# Patient Record
Sex: MALE | Race: WHITE | NOT HISPANIC OR LATINO | Employment: OTHER | URBAN - METROPOLITAN AREA
[De-identification: names, ages, dates, MRNs, and addresses within clinical notes are randomized per-mention and may not be internally consistent; named-entity substitution may affect disease eponyms.]

---

## 2021-01-22 ENCOUNTER — IMMUNIZATIONS (OUTPATIENT)
Dept: FAMILY MEDICINE CLINIC | Facility: HOSPITAL | Age: 81
End: 2021-01-22

## 2021-01-22 DIAGNOSIS — Z23 ENCOUNTER FOR IMMUNIZATION: Primary | ICD-10-CM

## 2021-01-22 PROCEDURE — 0001A SARS-COV-2 / COVID-19 MRNA VACCINE (PFIZER-BIONTECH) 30 MCG: CPT

## 2021-01-22 PROCEDURE — 91300 SARS-COV-2 / COVID-19 MRNA VACCINE (PFIZER-BIONTECH) 30 MCG: CPT

## 2021-02-12 ENCOUNTER — IMMUNIZATIONS (OUTPATIENT)
Dept: FAMILY MEDICINE CLINIC | Facility: HOSPITAL | Age: 81
End: 2021-02-12

## 2021-02-12 DIAGNOSIS — Z23 ENCOUNTER FOR IMMUNIZATION: Primary | ICD-10-CM

## 2021-02-12 PROCEDURE — 0002A SARS-COV-2 / COVID-19 MRNA VACCINE (PFIZER-BIONTECH) 30 MCG: CPT

## 2021-02-12 PROCEDURE — 91300 SARS-COV-2 / COVID-19 MRNA VACCINE (PFIZER-BIONTECH) 30 MCG: CPT

## 2021-05-11 ENCOUNTER — OFFICE VISIT (OUTPATIENT)
Dept: PULMONOLOGY | Facility: CLINIC | Age: 81
End: 2021-05-11
Payer: COMMERCIAL

## 2021-05-11 VITALS
WEIGHT: 269.6 LBS | DIASTOLIC BLOOD PRESSURE: 74 MMHG | HEIGHT: 74 IN | SYSTOLIC BLOOD PRESSURE: 126 MMHG | HEART RATE: 71 BPM | BODY MASS INDEX: 34.6 KG/M2 | OXYGEN SATURATION: 98 % | TEMPERATURE: 96.6 F

## 2021-05-11 DIAGNOSIS — J30.2 SEASONAL ALLERGIC RHINITIS, UNSPECIFIED TRIGGER: ICD-10-CM

## 2021-05-11 DIAGNOSIS — G47.33 OBSTRUCTIVE SLEEP APNEA: Primary | ICD-10-CM

## 2021-05-11 DIAGNOSIS — I48.0 PAROXYSMAL ATRIAL FIBRILLATION (HCC): ICD-10-CM

## 2021-05-11 DIAGNOSIS — I10 ESSENTIAL HYPERTENSION: ICD-10-CM

## 2021-05-11 DIAGNOSIS — E66.9 OBESITY (BMI 30.0-34.9): ICD-10-CM

## 2021-05-11 PROBLEM — J30.9 ALLERGIC RHINITIS: Status: ACTIVE | Noted: 2021-05-11

## 2021-05-11 PROCEDURE — 99214 OFFICE O/P EST MOD 30 MIN: CPT | Performed by: INTERNAL MEDICINE

## 2021-05-11 PROCEDURE — 1160F RVW MEDS BY RX/DR IN RCRD: CPT | Performed by: INTERNAL MEDICINE

## 2021-05-11 PROCEDURE — 1036F TOBACCO NON-USER: CPT | Performed by: INTERNAL MEDICINE

## 2021-05-11 RX ORDER — MIRTAZAPINE 7.5 MG/1
TABLET, FILM COATED ORAL
COMMUNITY
Start: 2021-02-22

## 2021-05-11 RX ORDER — ALLOPURINOL 100 MG/1
100 TABLET ORAL DAILY
COMMUNITY

## 2021-05-11 RX ORDER — FENOFIBRATE 160 MG/1
TABLET ORAL
COMMUNITY
Start: 2021-04-26

## 2021-05-11 RX ORDER — FLUTICASONE PROPIONATE 50 MCG
1 SPRAY, SUSPENSION (ML) NASAL DAILY
Qty: 18.2 ML | Refills: 0 | Status: SHIPPED | OUTPATIENT
Start: 2021-05-11 | End: 2021-06-08

## 2021-05-11 RX ORDER — VALSARTAN AND HYDROCHLOROTHIAZIDE 80; 12.5 MG/1; MG/1
1 TABLET, FILM COATED ORAL DAILY
COMMUNITY
Start: 2021-04-11

## 2021-05-11 RX ORDER — OMEGA-3/DHA/EPA/FISH OIL 300-1000MG
2 CAPSULE ORAL DAILY
COMMUNITY

## 2021-05-11 RX ORDER — BACLOFEN 10 MG/1
10 TABLET ORAL 3 TIMES DAILY PRN
COMMUNITY
Start: 2021-01-30 | End: 2022-01-30

## 2021-05-11 RX ORDER — TAMSULOSIN HYDROCHLORIDE 0.4 MG/1
0.4 CAPSULE ORAL
COMMUNITY
Start: 2021-04-11

## 2021-05-11 RX ORDER — APIXABAN 5 MG/1
5 TABLET, FILM COATED ORAL 2 TIMES DAILY
COMMUNITY
Start: 2021-04-14

## 2021-05-11 RX ORDER — CEPHALEXIN 500 MG/1
CAPSULE ORAL
COMMUNITY
Start: 2021-04-19

## 2021-05-11 RX ORDER — ZOLPIDEM TARTRATE 5 MG/1
5 TABLET ORAL DAILY PRN
COMMUNITY
Start: 2021-01-13 | End: 2021-07-12

## 2021-05-11 RX ORDER — FUROSEMIDE 20 MG/1
20 TABLET ORAL DAILY
COMMUNITY
Start: 2021-04-22

## 2021-05-11 NOTE — PROGRESS NOTES
Assessment/Plan:    Obstructive sleep apnea  Mr Alexandra brunner has obstructive sleep apnea and has been on CPAP therapy at 8 cm of water  His CPAP compliance was good and was getting clinical benefit from CPAP therapy  Of late he has got a skin cancer on his nose and is not able to use the CPAP  He has some daytime sleepiness and tiredness  His sleep is disturbed  I have advised him to restart using CPAP once the radiation therapy he is over  I had a long discussion with him and answered all his questions  Paroxysmal atrial fibrillation (HCC)  He has history of paroxysmal atrial fibrillation and is currently on treatment with metoprolol for rate control and Eliquis for systemic anticoagulation  He is status post pacemaker for sick sinus syndrome  He follows with Dr Nita Hoyos  Obesity (BMI 30 0-34  9)  He is obese and understands the need for weight reduction  He understands that weight reduction can significantly improve sleep apnea  Allergic rhinitis  He has seasonal allergic rhinitis and has a runny nose and sneezing and nasal block  I have prescribed Flonase to be used every day  Essential hypertension  He has history of hypertension and has been on treatment with valsartan hydrochlorothiazide  Currently his blood pressure is controlled  The CPAP therapy also helps his blood pressure control  Diagnoses and all orders for this visit:    Obstructive sleep apnea    Paroxysmal atrial fibrillation (HCC)    Seasonal allergic rhinitis, unspecified trigger  -     fluticasone (FLONASE) 50 mcg/act nasal spray; 1 spray into each nostril daily    Obesity (BMI 30 0-34  9)    Essential hypertension    Other orders  -     allopurinol (ZYLOPRIM) 100 mg tablet; Take 100 mg by mouth daily  -     Eliquis 5 MG; Take 5 mg by mouth 2 (two) times a day  -     baclofen 10 mg tablet;  Take 10 mg by mouth Three times daily as needed  -     cephalexin (KEFLEX) 500 mg capsule  -     fenofibrate (TRIGLIDE) 160 MG tablet  -     furosemide (LASIX) 20 mg tablet; Take 20 mg by mouth daily  -     metoprolol tartrate (LOPRESSOR) 25 mg tablet; Take 25 mg by mouth daily  -     mirtazapine (REMERON) 7 5 MG tablet; TAKE 1 TABLET (7 5 MG TOTAL) BY MOUTH EVERY EVENING   -     tamsulosin (FLOMAX) 0 4 mg; Take 0 4 mg by mouth daily at bedtime  -     valsartan-hydrochlorothiazide (DIOVAN-HCT) 80-12 5 MG per tablet; Take 1 tablet by mouth daily  -     zolpidem (AMBIEN) 5 mg tablet; Take 5 mg by mouth daily as needed  -     B Complex Vitamins (VITAMIN B COMPLEX PO); Take 1 tablet by mouth daily  -     Flaxseed, Linseed, (FLAXSEED OIL PO); Take 1,200 mg by mouth daily  -     fish oil-omega-3 fatty acids 1000 MG capsule; Take 2 g by mouth daily          Subjective:      Patient ID: Natalia Greene is a 79 y o  male  Mr Arianne Fleming has obstructive sleep apnea and has been on CPAP therapy  He was diagnosed with obstructive sleep apnea of mild degree associated with significant oxygen desaturation on a diagnostic overnight home sleep study performed on February 2020  He was subsequently started on CPAP 8 cm of water using a [employer] [place]  nasal mask size medium  He was using CPAP regularly after that until few weeks back when he developed skin cancer on his nose  He is currently undergoing radiation therapy and is not using the mask  He has some daytime sleepiness and tiredness  I reviewed his CPAP compliance records and they are obviously very poor  He wants to restart using CPAP after the radiation therapy is over in few weeks time  He is obese and understands the need for weight reduction  He states that he has already weight lost some weight voluntarily  He has paroxysmal atrial fibrillation  He also had sick sinus syndrome and is status post pacemaker  He follows with Dr Whitney Walters  He is on systemic anticoagulation  He denies any chest pain or palpitations    He has no significant shortness of breath or cough or phlegm or wheeze or chest pain  However he has rhinorrhea and sneezing  He feels nasal block and sometimes it interferes with his sleep  He is using saline nasal spray  He is a previous smoker but quit in 2008  Currently he is not on any inhaler or oxygen  The following portions of the patient's history were reviewed and updated as appropriate: allergies, current medications, past family history, past medical history, past social history, past surgical history and problem list     Review of Systems   Constitutional: Positive for fatigue  Negative for appetite change, chills and fever  HENT: Positive for congestion, rhinorrhea and sneezing  Negative for hearing loss, sore throat, trouble swallowing and voice change  Eyes: Negative for visual disturbance  Respiratory: Negative for cough, chest tightness, shortness of breath and wheezing  Cardiovascular: Negative for leg swelling  Gastrointestinal: Negative for abdominal distention, abdominal pain, constipation, nausea and vomiting  Endocrine: Negative for polyuria  Genitourinary: Negative for dysuria, frequency and urgency  Musculoskeletal: Positive for arthralgias  Negative for gait problem  Skin: Negative for pallor  Skin cancer nose currently on radiation treatment   Allergic/Immunologic: Positive for environmental allergies  Neurological: Negative for dizziness, syncope, light-headedness and headaches  Psychiatric/Behavioral: Positive for sleep disturbance  Negative for agitation  The patient is nervous/anxious  Objective:      /74 (BP Location: Left arm, Patient Position: Sitting)   Pulse 71   Temp (!) 96 6 °F (35 9 °C) (Tympanic)   Ht 6' 2" (1 88 m)   Wt 122 kg (269 lb 9 6 oz)   SpO2 98%   BMI 34 61 kg/m²          Physical Exam  Vitals signs reviewed  Constitutional:       General: He is not in acute distress  Appearance: He is obese  He is not ill-appearing or toxic-appearing     HENT: Head: Normocephalic  Eyes:      General: No scleral icterus  Conjunctiva/sclera: Conjunctivae normal    Neck:      Musculoskeletal: No neck rigidity or muscular tenderness  Vascular: No carotid bruit  Cardiovascular:      Rate and Rhythm: Normal rate and regular rhythm  Heart sounds: Normal heart sounds  No murmur  Pulmonary:      Effort: Pulmonary effort is normal  No respiratory distress  Breath sounds: Normal breath sounds  No stridor  No wheezing, rhonchi or rales  Abdominal:      General: Bowel sounds are normal       Palpations: Abdomen is soft  Tenderness: There is no abdominal tenderness  There is no guarding  Musculoskeletal:      Right lower leg: No edema  Left lower leg: No edema  Lymphadenopathy:      Cervical: No cervical adenopathy  Neurological:      Mental Status: He is alert and oriented to person, place, and time  Gait: Gait normal    Psychiatric:         Mood and Affect: Mood normal          Behavior: Behavior normal          Thought Content:  Thought content normal          Judgment: Judgment normal

## 2021-05-11 NOTE — ASSESSMENT & PLAN NOTE
Mr Bell [place] fissure has obstructive sleep apnea and has been on CPAP therapy at 8 cm of water  His CPAP compliance was good and was getting clinical benefit from CPAP therapy  Of late he has got a skin cancer on his nose and is not able to use the CPAP  He has some daytime sleepiness and tiredness  His sleep is disturbed  I have advised him to restart using CPAP once the radiation therapy he is over  I had a long discussion with him and answered all his questions

## 2021-05-11 NOTE — ASSESSMENT & PLAN NOTE
He has seasonal allergic rhinitis and has a runny nose and sneezing and nasal block  I have prescribed Flonase to be used every day

## 2021-05-11 NOTE — ASSESSMENT & PLAN NOTE
He has history of hypertension and has been on treatment with valsartan hydrochlorothiazide  Currently his blood pressure is controlled  The CPAP therapy also helps his blood pressure control

## 2021-05-11 NOTE — ASSESSMENT & PLAN NOTE
He is obese and understands the need for weight reduction  He understands that weight reduction can significantly improve sleep apnea

## 2021-05-11 NOTE — ASSESSMENT & PLAN NOTE
He has history of paroxysmal atrial fibrillation and is currently on treatment with metoprolol for rate control and Eliquis for systemic anticoagulation  He is status post pacemaker for sick sinus syndrome  He follows with Dr Fabián Barnes

## 2021-06-04 DIAGNOSIS — J30.2 SEASONAL ALLERGIC RHINITIS, UNSPECIFIED TRIGGER: ICD-10-CM

## 2021-06-08 RX ORDER — FLUTICASONE PROPIONATE 50 MCG
SPRAY, SUSPENSION (ML) NASAL
Qty: 16 ML | Refills: 1 | Status: SHIPPED | OUTPATIENT
Start: 2021-06-08 | End: 2021-07-06

## 2021-07-01 DIAGNOSIS — J30.2 SEASONAL ALLERGIC RHINITIS, UNSPECIFIED TRIGGER: ICD-10-CM

## 2021-07-06 RX ORDER — FLUTICASONE PROPIONATE 50 MCG
SPRAY, SUSPENSION (ML) NASAL
Qty: 16 ML | Refills: 1 | Status: SHIPPED | OUTPATIENT
Start: 2021-07-06 | End: 2021-07-13

## 2021-07-11 DIAGNOSIS — J30.2 SEASONAL ALLERGIC RHINITIS, UNSPECIFIED TRIGGER: ICD-10-CM

## 2021-07-13 RX ORDER — FLUTICASONE PROPIONATE 50 MCG
SPRAY, SUSPENSION (ML) NASAL
Qty: 48 ML | Refills: 1 | Status: SHIPPED | OUTPATIENT
Start: 2021-07-13

## 2021-12-08 ENCOUNTER — OFFICE VISIT (OUTPATIENT)
Dept: PULMONOLOGY | Facility: CLINIC | Age: 81
End: 2021-12-08
Payer: COMMERCIAL

## 2021-12-08 VITALS
TEMPERATURE: 96.9 F | OXYGEN SATURATION: 97 % | WEIGHT: 260 LBS | HEIGHT: 74 IN | BODY MASS INDEX: 33.37 KG/M2 | HEART RATE: 70 BPM | DIASTOLIC BLOOD PRESSURE: 80 MMHG | SYSTOLIC BLOOD PRESSURE: 122 MMHG

## 2021-12-08 DIAGNOSIS — G47.33 OBSTRUCTIVE SLEEP APNEA: Primary | ICD-10-CM

## 2021-12-08 DIAGNOSIS — I48.0 PAROXYSMAL ATRIAL FIBRILLATION (HCC): ICD-10-CM

## 2021-12-08 DIAGNOSIS — I10 ESSENTIAL HYPERTENSION: ICD-10-CM

## 2021-12-08 DIAGNOSIS — E66.9 OBESITY (BMI 30.0-34.9): ICD-10-CM

## 2021-12-08 PROCEDURE — 99214 OFFICE O/P EST MOD 30 MIN: CPT | Performed by: INTERNAL MEDICINE
